# Patient Record
Sex: MALE | Race: WHITE | NOT HISPANIC OR LATINO | Employment: FULL TIME | URBAN - METROPOLITAN AREA
[De-identification: names, ages, dates, MRNs, and addresses within clinical notes are randomized per-mention and may not be internally consistent; named-entity substitution may affect disease eponyms.]

---

## 2024-04-14 ENCOUNTER — HOSPITAL ENCOUNTER (EMERGENCY)
Facility: CLINIC | Age: 34
Discharge: HOME | End: 2024-04-14
Attending: EMERGENCY MEDICINE | Admitting: EMERGENCY MEDICINE
Payer: COMMERCIAL

## 2024-04-14 VITALS
TEMPERATURE: 97.4 F | HEART RATE: 70 BPM | OXYGEN SATURATION: 96 % | BODY MASS INDEX: 27.77 KG/M2 | DIASTOLIC BLOOD PRESSURE: 84 MMHG | SYSTOLIC BLOOD PRESSURE: 118 MMHG | RESPIRATION RATE: 13 BRPM | HEIGHT: 78 IN | WEIGHT: 240 LBS

## 2024-04-14 DIAGNOSIS — I48.91 NEW ONSET ATRIAL FIBRILLATION (H): ICD-10-CM

## 2024-04-14 LAB
ANION GAP SERPL CALCULATED.3IONS-SCNC: 9 MMOL/L (ref 7–15)
ATRIAL RATE - MUSE: 73 BPM
ATRIAL RATE - MUSE: NORMAL BPM
BUN SERPL-MCNC: 16.2 MG/DL (ref 6–20)
CALCIUM SERPL-MCNC: 9.6 MG/DL (ref 8.6–10)
CHLORIDE SERPL-SCNC: 103 MMOL/L (ref 98–107)
CREAT SERPL-MCNC: 1.22 MG/DL (ref 0.67–1.17)
DEPRECATED HCO3 PLAS-SCNC: 28 MMOL/L (ref 22–29)
DIASTOLIC BLOOD PRESSURE - MUSE: NORMAL MMHG
DIASTOLIC BLOOD PRESSURE - MUSE: NORMAL MMHG
EGFRCR SERPLBLD CKD-EPI 2021: 80 ML/MIN/1.73M2
GLUCOSE SERPL-MCNC: 102 MG/DL (ref 70–99)
HOLD SPECIMEN: NORMAL
INTERPRETATION ECG - MUSE: NORMAL
INTERPRETATION ECG - MUSE: NORMAL
P AXIS - MUSE: 59 DEGREES
P AXIS - MUSE: NORMAL DEGREES
POTASSIUM SERPL-SCNC: 4.2 MMOL/L (ref 3.4–5.3)
PR INTERVAL - MUSE: 166 MS
PR INTERVAL - MUSE: NORMAL MS
QRS DURATION - MUSE: 106 MS
QRS DURATION - MUSE: 98 MS
QT - MUSE: 358 MS
QT - MUSE: 410 MS
QTC - MUSE: 451 MS
QTC - MUSE: 484 MS
R AXIS - MUSE: 43 DEGREES
R AXIS - MUSE: 6 DEGREES
SODIUM SERPL-SCNC: 140 MMOL/L (ref 135–145)
SYSTOLIC BLOOD PRESSURE - MUSE: NORMAL MMHG
SYSTOLIC BLOOD PRESSURE - MUSE: NORMAL MMHG
T AXIS - MUSE: 17 DEGREES
T AXIS - MUSE: 17 DEGREES
VENTRICULAR RATE- MUSE: 110 BPM
VENTRICULAR RATE- MUSE: 73 BPM

## 2024-04-14 PROCEDURE — 92960 CARDIOVERSION ELECTRIC EXT: CPT

## 2024-04-14 PROCEDURE — 93005 ELECTROCARDIOGRAM TRACING: CPT | Mod: 59

## 2024-04-14 PROCEDURE — 250N000011 HC RX IP 250 OP 636: Performed by: EMERGENCY MEDICINE

## 2024-04-14 PROCEDURE — 250N000013 HC RX MED GY IP 250 OP 250 PS 637: Performed by: EMERGENCY MEDICINE

## 2024-04-14 PROCEDURE — 36415 COLL VENOUS BLD VENIPUNCTURE: CPT | Performed by: EMERGENCY MEDICINE

## 2024-04-14 PROCEDURE — 80048 BASIC METABOLIC PNL TOTAL CA: CPT | Performed by: EMERGENCY MEDICINE

## 2024-04-14 PROCEDURE — 272N000240 HC CARDIOVERT/DEFIB/PACER SUPP

## 2024-04-14 PROCEDURE — 96374 THER/PROPH/DIAG INJ IV PUSH: CPT

## 2024-04-14 PROCEDURE — 99285 EMERGENCY DEPT VISIT HI MDM: CPT | Mod: 25

## 2024-04-14 RX ORDER — PROPOFOL 10 MG/ML
0.5 INJECTION, EMULSION INTRAVENOUS CONTINUOUS PRN
Status: DISCONTINUED | OUTPATIENT
Start: 2024-04-14 | End: 2024-04-14 | Stop reason: HOSPADM

## 2024-04-14 RX ORDER — PROPOFOL 10 MG/ML
INJECTION, EMULSION INTRAVENOUS DAILY PRN
Status: COMPLETED | OUTPATIENT
Start: 2024-04-14 | End: 2024-04-14

## 2024-04-14 RX ADMIN — PROPOFOL 20 MG: 10 INJECTION, EMULSION INTRAVENOUS at 16:51

## 2024-04-14 RX ADMIN — APIXABAN 5 MG: 5 TABLET, FILM COATED ORAL at 17:36

## 2024-04-14 RX ADMIN — PROPOFOL 80 MG: 10 INJECTION, EMULSION INTRAVENOUS at 16:50

## 2024-04-14 RX ADMIN — PROPOFOL 40 MG: 10 INJECTION, EMULSION INTRAVENOUS at 16:52

## 2024-04-14 ASSESSMENT — ACTIVITIES OF DAILY LIVING (ADL)
ADLS_ACUITY_SCORE: 35

## 2024-04-14 ASSESSMENT — COLUMBIA-SUICIDE SEVERITY RATING SCALE - C-SSRS
2. HAVE YOU ACTUALLY HAD ANY THOUGHTS OF KILLING YOURSELF IN THE PAST MONTH?: NO
6. HAVE YOU EVER DONE ANYTHING, STARTED TO DO ANYTHING, OR PREPARED TO DO ANYTHING TO END YOUR LIFE?: NO
1. IN THE PAST MONTH, HAVE YOU WISHED YOU WERE DEAD OR WISHED YOU COULD GO TO SLEEP AND NOT WAKE UP?: NO

## 2024-04-14 NOTE — ED PROVIDER NOTES
"  History     Chief Complaint:  Palpitations       HPI   Vadim Wood is a 33 year old male without medical problems presenting to us because of atrial fibrillation.  The patient states that he was in his normal state of health last night.  He wears an Apple Watch every day and notes that his resting heart rate is typically in the 50s.  He was surprised when he got an alert that told him that he was in atrial fibrillation.  He went to [employer] today and kept the watch on, with continued notifications and a heart rate that is now in the 90s.  Otherwise, he has not felt lightheaded, experience chest pain, shortness of breath, or any other symptoms.  With these issues, who presents to the ER for evaluation.  He denies excessive alcohol use.  He denies significant stimulant abuse.  He denies prior cardiac issues.  He denies other complaints at this juncture.      Independent Historian:   None - Patient Only    Review of External Notes:   NOne       Medications:    apixaban ANTICOAGULANT (ELIQUIS) 5 MG tablet        Past Medical History:    No past medical history on file.    Past Surgical History:    No past surgical history on file.     Physical Exam   Patient Vitals for the past 24 hrs:   BP Temp Temp src Pulse Resp SpO2 Height Weight   04/14/24 1800 118/84 -- -- 70 13 96 % -- --   04/14/24 1659 -- -- -- 71 (!) 9 95 % -- --   04/14/24 1655 124/70 -- -- -- -- -- -- --   04/14/24 1653 -- -- -- -- 12 -- -- --   04/14/24 1651 -- -- -- -- 16 -- -- --   04/14/24 1644 -- -- -- 82 (!) 7 97 % -- --   04/14/24 1644 134/85 -- -- 90 17 98 % -- --   04/14/24 1600 (!) 119/90 -- -- 94 24 97 % -- --   04/14/24 1537 136/77 97.4  F (36.3  C) Temporal 72 18 97 % 1.981 m (6' 6\") 108.9 kg (240 lb)        Physical Exam    Eye:  Pupils are equal, round, and reactive.  Extraocular movements intact.    ENT:  No rhinorrhea.  Moist mucus membranes.  Normal tongue and tonsil.    Cardiac: Irregularly irregular.  No murmurs, gallops, or " rubs.    Pulmonary:  Clear to auscultation bilaterally.  No wheezes, rales, or rhonchi.    Abdomen:  Positive bowel sounds.  Abdomen is soft and non-distended, without focal tenderness.    Musculoskeletal:  Normal movement of all extremities without evidence for deficit.    Skin:  Warm and dry without rashes.    Neurologic:  Non-focal exam without asymmetric weakness or numbness.     Psychiatric:  Normal affect with appropriate interaction with examiner.      Emergency Department Course   ECG  ECG results from 04/14/24   EKG 12 lead     Value    Systolic Blood Pressure     Diastolic Blood Pressure     Ventricular Rate 110    Atrial Rate     NM Interval     QRS Duration 98        QTc 484    P Axis     R AXIS 43    T Axis 17    Interpretation ECG      Atrial fibrillation with rapid ventricular response  RSR' or QR pattern in V1 suggests right ventricular conduction delay  Abnormal ECG  No previous ECGs available  Confirmed by GENERATED REPORT, COMPUTER ([id number]),  Kristin Curtis ([id number]) on 4/14/2024 3:51:23 PM       EKG #2: Patient underwent a second EKG after cardioversion which shows a normal sinus rhythm with a rate in the 70s.  Shows normal axis and interval.  There is no ST ovation, depression, or T wave inversion concerning for ischemia.  Normal EKG.      Laboratory:  Labs Ordered and Resulted from Time of ED Arrival to Time of ED Departure   BASIC METABOLIC PANEL - Abnormal       Result Value    Sodium 140      Potassium 4.2      Chloride 103      Carbon Dioxide (CO2) 28      Anion Gap 9      Urea Nitrogen 16.2      Creatinine 1.22 (*)     GFR Estimate 80      Calcium 9.6      Glucose 102 (*)         Procedures   Electrical Cardioversion         Procedure: Electrical Cardioversion        Indication: Atrial Fibrillation     Consent: Written from Patient     Universal Protocol: Universal protocol was followed and time out conducted just prior to starting procedure, confirming patient identity, site/side,  procedure, patient position, and availability of correct equipment and implants.      Anesthesia/Sedation: Sedation: The patient was sedated, see separate procedure note for details.      Procedure Detail:   Electrodes were placed: Anterior/posterior  Trial #1: Synchronized Cardioversion at 200 Joules     Cardioversion was successful      Patient Status:  The patient tolerated the procedure well: Yes. There were no complications      Sedation     Procedure: Procedural Sedation    Sedation Level: Moderate    Indication: Cardioversion    Consent: Verbal from Patient     Universal protocol: Universal protocol was followed and time out conducted just prior to starting procedure, confirming patient identity, site/side, procedure, patient position, and availability of correct equipment and implants.     Last PO Intake: Emergent procedure    ASA Class: Class 1 - A normal healthy patient     Exam:  Mallampati:  Grade 1 - Soft palate, uvula, and pillars visible    Lungs: Clear   Heart: Regular rate and rhythm     Medication: Propofol  Dose: 140 mg     Monitoring:  Monitoring consisted of heart rate, cardiac, continuous pulse oximetry, frequent blood pressure checks.   There was constant attendance by RN until patient recovered and constant attendance by physician until patient stable.   Intubation and emergency airway equipment available.     Response: Vital signs stable, oxygen saturations greater than 92%       Patient Status: Post procedure patient was alert.     Total Physician Drug Administration / Monitoring Time: 15 minutes.     Patient was monitored during recovery and returned to pre-procedure baseline.   .      Emergency Department Course & Assessments:    Interventions:  Medications   propofol (DIPRIVAN) injection 10 mg/mL vial (has no administration in time range)   propofol (DIPRIVAN) injection 10 mg/mL vial (40 mg Intravenous $Given 4/14/24 9052)   apixaban ANTICOAGULANT (ELIQUIS) tablet 5 mg (5 mg Oral $Given  4/14/24 4496)        Independent Interpretation (X-rays, CTs, rhythm strip):  None    Consultations/Discussion of Management or Tests:  None        Social Determinants of Health affecting care:   None    Disposition:  The patient was discharged.     Impression & Plan    CMS Diagnoses: None    Medical Decision Making:  This healthy 33-year-old presents to us in atrial fibrillation.  There is no clear inciting event for this.  He is otherwise a very healthy gentleman, exercising regularly, with no medical problems.  The patient is very clear on the time of his dysrhythmia, starting at 1052 last night.  I believe this time as he otherwise wears his Apple Watch consistently every single day and this is the first time that he has had an elevated heart rate or irregular heartbeat.  Therefore, I feel he is an ideal candidate for electrocardioversion.  He shows no renal insufficiency or electrolyte abnormalities.  After discussion with him on the benefits and risks of cardioversion, he is elected to move forward.  This was performed as above without difficulty.  The patient was then observed for an hour and has stayed in normal sinus rhythm.  He will be discharged home on 30 days of Eliquis and I have scheduled him for an echocardiogram and follow-up with cardiology.  The patient's questions were answered he is comfortable to plan for discharge.      Diagnosis:    ICD-10-CM    1. New onset atrial fibrillation (H)  I48.91 Echocardiogram Complete     Adult Cardiology [facility] Referral           Discharge Medications:  New Prescriptions    APIXABAN ANTICOAGULANT (ELIQUIS) 5 MG TABLET    Take 1 tablet (5 mg) by mouth 2 times daily for 30 days          Chad A. Trierweiler, MD  4/14/2024   Trierweiler, Chad A, MD Trierweiler, Chad A, MD  04/14/24 5582

## 2024-04-14 NOTE — ED TRIAGE NOTES
The patient reports that he noticed this morning that his watch noted him to be in Afib and it continues to show it today. He does not have history of Afib. Denies blood thinners. He reports it feel like his heart skips a beat every once in a while and is faster than normal for him but no other symptoms.      Triage Assessment (Adult)       Row Name 04/14/24 1533          Triage Assessment    Airway WDL WDL        Respiratory WDL    Respiratory WDL WDL        Cardiac WDL    Cardiac WDL X;all  feel like heart skips a beat and is faster rate than normal.        Cognitive/Neuro/Behavioral WDL    Cognitive/Neuro/Behavioral WDL WDL

## 2024-05-01 ENCOUNTER — HOSPITAL ENCOUNTER (OUTPATIENT)
Dept: CARDIOLOGY | Facility: CLINIC | Age: 34
Discharge: HOME | End: 2024-05-01
Attending: EMERGENCY MEDICINE | Admitting: EMERGENCY MEDICINE
Payer: COMMERCIAL

## 2024-05-01 DIAGNOSIS — I48.91 NEW ONSET ATRIAL FIBRILLATION (H): ICD-10-CM

## 2024-05-01 LAB — LVEF ECHO: NORMAL

## 2024-05-01 PROCEDURE — 93306 TTE W/DOPPLER COMPLETE: CPT

## 2024-05-01 PROCEDURE — 93306 TTE W/DOPPLER COMPLETE: CPT | Mod: 26 | Performed by: INTERNAL MEDICINE

## 2024-05-03 ENCOUNTER — OFFICE VISIT (OUTPATIENT)
Dept: CARDIOLOGY | Facility: CLINIC | Age: 34
End: 2024-05-03
Attending: EMERGENCY MEDICINE
Payer: COMMERCIAL

## 2024-05-03 ENCOUNTER — LAB (OUTPATIENT)
Dept: LAB | Facility: CLINIC | Age: 34
End: 2024-05-03
Payer: COMMERCIAL

## 2024-05-03 VITALS
SYSTOLIC BLOOD PRESSURE: 126 MMHG | BODY MASS INDEX: 28.6 KG/M2 | HEIGHT: 78 IN | HEART RATE: 66 BPM | DIASTOLIC BLOOD PRESSURE: 82 MMHG | WEIGHT: 247.2 LBS

## 2024-05-03 DIAGNOSIS — I77.810 AORTIC ROOT DILATATION (H): ICD-10-CM

## 2024-05-03 DIAGNOSIS — I48.0 AF (PAROXYSMAL ATRIAL FIBRILLATION) (H): Primary | ICD-10-CM

## 2024-05-03 DIAGNOSIS — I48.0 AF (PAROXYSMAL ATRIAL FIBRILLATION) (H): ICD-10-CM

## 2024-05-03 LAB
ANION GAP SERPL CALCULATED.3IONS-SCNC: 12 MMOL/L (ref 7–15)
BUN SERPL-MCNC: 13 MG/DL (ref 6–20)
CALCIUM SERPL-MCNC: 10.1 MG/DL (ref 8.6–10)
CHLORIDE SERPL-SCNC: 101 MMOL/L (ref 98–107)
CREAT SERPL-MCNC: 1.19 MG/DL (ref 0.67–1.17)
DEPRECATED HCO3 PLAS-SCNC: 27 MMOL/L (ref 22–29)
EGFRCR SERPLBLD CKD-EPI 2021: 83 ML/MIN/1.73M2
GLUCOSE SERPL-MCNC: 89 MG/DL (ref 70–99)
MAGNESIUM SERPL-MCNC: 2.3 MG/DL (ref 1.7–2.3)
POTASSIUM SERPL-SCNC: 4 MMOL/L (ref 3.4–5.3)
SODIUM SERPL-SCNC: 140 MMOL/L (ref 135–145)
TSH SERPL DL<=0.005 MIU/L-ACNC: 1.81 UIU/ML (ref 0.3–4.2)

## 2024-05-03 PROCEDURE — 99204 OFFICE O/P NEW MOD 45 MIN: CPT | Performed by: INTERNAL MEDICINE

## 2024-05-03 PROCEDURE — 82374 ASSAY BLOOD CARBON DIOXIDE: CPT

## 2024-05-03 PROCEDURE — 93000 ELECTROCARDIOGRAM COMPLETE: CPT | Performed by: INTERNAL MEDICINE

## 2024-05-03 PROCEDURE — 82565 ASSAY OF CREATININE: CPT

## 2024-05-03 PROCEDURE — 84443 ASSAY THYROID STIM HORMONE: CPT

## 2024-05-03 PROCEDURE — 83735 ASSAY OF MAGNESIUM: CPT

## 2024-05-03 PROCEDURE — 36415 COLL VENOUS BLD VENIPUNCTURE: CPT

## 2024-05-03 NOTE — PROGRESS NOTES
HPI and Plan:   Vadim is a pleasant 33-year-old male who was recently seen in the emergency room on 14 April for atrial fibrillation.    On Saturday night and he had watched golf and had 2 beers.  He was resting and then his Apple Watch notified him that his heart rate was irregular and may be atrial fibrillation.  Heart rates varying about 90 to 100 bpm.  He was asymptomatic.  He was not aware of any palpitations dizziness or syncope or tiredness.  As rhythm persisted, eventually went to the emergency room and was noted to be in atrial fibrillation with mildly rapid ventricular rate of 110 bpm.  I reviewed the EKGs from that day.  He was seen by the emergency room physician and cardioverted successfully.  He was put on Eliquis which she is still taking.    He has not had any recurrent episodes.  He denies any chest pain shortness of breath palpitations.  He has no history of hypertension or diabetes.  No family history of premature CAD, sudden death or atrial fibrillation or aortic disease.  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     He exercises regularly.  He does put cardio as well as lifting weights.    On exam, regular rate and rhythm.  No murmurs.  Chest was reauscultation.    EKG done today was reviewed by me and revealed sinus rhythm without ischemic changes.  Incomplete right bundle branch block was noted.    Echocardiogram done recently revealed normal ejection fraction.  LA size was normal.  There was slight dilatation of aortic root at 4.1 cm but patient's height is 6 foot 6 inches.  This may be upper limits of normal for him.    Impression    1.  Paroxysmal atrial fibrillation, new onset, was normal symptomatic.  He was notified by [facility] and underwent cardioversion in the emergency room.  He is on Eliquis but his chads vascular score is 0.  He has likely lone atrial fibrillation.  I  suggested completing 3 to 4 weeks of Eliquis and stopping it and then can consider only baby aspirin since this is the first episode he was normal symptomatic, I am not adding any AV charmaine blockers at this time.  We discussed pathophysiology of atrial fibrillation including treatment options of beta-blockers, antiarrhythmic drugs as well as ablation as needed.  For now, we will continue observation.    2.  Aortic root dilatation of 4.1 cm, given his height, this may be just upper limits of normal.  Nevertheless, I would suggest an MRI to get more accurate size of the aortic root and ascending aorta.    Further recommendations based on the results of the MRI.  If he has no recurrent episodes, we will see him on an annual basis.  On the other hand if he has more frequent episodes of atrial fibrillation, he will be referred to electrophysiologist.    In the emergency room he only had a BMP which showed slight increase in creatinine.  I will repeat a BMP today with TSH and magnesium levels to ensure that there are no abnormalities in the labs.    Thank you for allowing us to personally care of this nice patient.  At today's visit, reviewed the emergency room notes, the EKGs from that day.  Echo report was reviewed.  BMP was reviewed.  Today's medical decision making was of moderate complexity.    Sincerely,    Dwain Caballero MD          Orders Placed This Encounter   Procedures    MRI Angiogram chest w & w/o contrast    Basic metabolic panel    TSH    Magnesium    Follow-Up with Cardiology    EKG 12-lead complete w/read - Clinics (performed today)       No orders of the defined types were placed in this encounter.      There are no discontinued medications.      Encounter Diagnoses   Name Primary?    AF (paroxysmal atrial fibrillation) (H) Yes    Aortic root dilatation (H24)        CURRENT MEDICATIONS:  Current Outpatient Medications   Medication Sig Dispense Refill    apixaban ANTICOAGULANT (ELIQUIS) 5 MG tablet Take 1  "tablet (5 mg) by mouth 2 times daily for 30 days 60 tablet 0       ALLERGIES   No Known Allergies    PAST MEDICAL HISTORY:  History reviewed. No pertinent past medical history.    PAST SURGICAL HISTORY:  History reviewed. No pertinent surgical history.    FAMILY HISTORY:  Family History   Problem Relation Age of Onset    No Known Problems Mother     No Known Problems Father        SOCIAL HISTORY:  Social History     Socioeconomic History    Marital status: Single     Spouse name: None    Number of children: None    Years of education: None    Highest education level: None   Tobacco Use    Smoking status: Never    Smokeless tobacco: Never   Substance and Sexual Activity    Alcohol use: Yes     Comment: occ       Review of Systems:  Skin:          Eyes:         ENT:         Respiratory:  Negative       Cardiovascular:  Negative      Gastroenterology:        Genitourinary:         Musculoskeletal:         Neurologic:         Psychiatric:         Heme/Lymph/Imm:         Endocrine:  Negative        Physical Exam:  Vitals: /82 (BP Location: Left arm, Patient Position: Sitting)   Pulse 66   Ht 1.981 m (6' 6\")   Wt 112.1 kg (247 lb 3.2 oz)   BMI 28.57 kg/m        CC  Chad A Trierweiler, MD  [facility]  [address]  [place] [address]                "

## 2024-05-03 NOTE — LETTER
5/3/2024    Physician No Ref-Primary  No address on file    RE: Vadim Wood       Dear Colleague,     I had the pleasure of seeing Vadim Wood in the [facility].  HPI and Plan:   Vadim is a pleasant 33-year-old male who was recently seen in the emergency room on 14 April for atrial fibrillation.    On Saturday night and he had watched golf and had 2 beers.  He was resting and then his Apple Watch notified him that his heart rate was irregular and may be atrial fibrillation.  Heart rates varying about 90 to 100 bpm.  He was asymptomatic.  He was not aware of any palpitations dizziness or syncope or tiredness.  As rhythm persisted, eventually went to the emergency room and was noted to be in atrial fibrillation with mildly rapid ventricular rate of 110 bpm.  I reviewed the EKGs from that day.  He was seen by the emergency room physician and cardioverted successfully.  He was put on Eliquis which she is still taking.    He has not had any recurrent episodes.  He denies any chest pain shortness of breath palpitations.  He has no history of hypertension or diabetes.  No family history of premature CAD, sudden death or atrial fibrillation or aortic disease.  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     He exercises regularly.  He does put cardio as well as lifting weights.    On exam, regular rate and rhythm.  No murmurs.  Chest was reauscultation.    EKG done today was reviewed by me and revealed sinus rhythm without ischemic changes.  Incomplete right bundle branch block was noted.    Echocardiogram done recently revealed normal ejection fraction.  LA size was normal.  There was slight dilatation of aortic root at 4.1 cm but patient's height is 6 foot 6 inches.  This may be upper limits of normal for him.    Impression    1.  Paroxysmal atrial fibrillation, new onset, was normal symptomatic.   He was notified by [facility] and underwent cardioversion in the emergency room.  He is on Eliquis but his chads vascular score is 0.  He has likely lone atrial fibrillation.  I suggested completing 3 to 4 weeks of Eliquis and stopping it and then can consider only baby aspirin since this is the first episode he was normal symptomatic, I am not adding any AV charmaine blockers at this time.  We discussed pathophysiology of atrial fibrillation including treatment options of beta-blockers, antiarrhythmic drugs as well as ablation as needed.  For now, we will continue observation.    2.  Aortic root dilatation of 4.1 cm, given his height, this may be just upper limits of normal.  Nevertheless, I would suggest an MRI to get more accurate size of the aortic root and ascending aorta.    Further recommendations based on the results of the MRI.  If he has no recurrent episodes, we will see him on an annual basis.  On the other hand if he has more frequent episodes of atrial fibrillation, he will be referred to electrophysiologist.    In the emergency room he only had a BMP which showed slight increase in creatinine.  I will repeat a BMP today with TSH and magnesium levels to ensure that there are no abnormalities in the labs.    Thank you for allowing us to personally care of this nice patient.  At today's visit, reviewed the emergency room notes, the EKGs from that day.  Echo report was reviewed.  BMP was reviewed.  Today's medical decision making was of moderate complexity.    Sincerely,    Dwain Caballero MD          Orders Placed This Encounter   Procedures    MRI Angiogram chest w & w/o contrast    Basic metabolic panel    TSH    Magnesium    Follow-Up with Cardiology    EKG 12-lead complete w/read - Clinics (performed today)       No orders of the defined types were placed in this encounter.      There are no discontinued medications.      Encounter Diagnoses   Name Primary?    AF (paroxysmal atrial fibrillation) (H) Yes     "Aortic root dilatation (H24)        CURRENT MEDICATIONS:  Current Outpatient Medications   Medication Sig Dispense Refill    apixaban ANTICOAGULANT (ELIQUIS) 5 MG tablet Take 1 tablet (5 mg) by mouth 2 times daily for 30 days 60 tablet 0       ALLERGIES   No Known Allergies    PAST MEDICAL HISTORY:  History reviewed. No pertinent past medical history.    PAST SURGICAL HISTORY:  History reviewed. No pertinent surgical history.    FAMILY HISTORY:  Family History   Problem Relation Age of Onset    No Known Problems Mother     No Known Problems Father        SOCIAL HISTORY:  Social History     Socioeconomic History    Marital status: Single     Spouse name: None    Number of children: None    Years of education: None    Highest education level: None   Tobacco Use    Smoking status: Never    Smokeless tobacco: Never   Substance and Sexual Activity    Alcohol use: Yes     Comment: occ       Review of Systems:  Skin:          Eyes:         ENT:         Respiratory:  Negative       Cardiovascular:  Negative      Gastroenterology:        Genitourinary:         Musculoskeletal:         Neurologic:         Psychiatric:         Heme/Lymph/Imm:         Endocrine:  Negative        Physical Exam:  Vitals: /82 (BP Location: Left arm, Patient Position: Sitting)   Pulse 66   Ht 1.981 m (6' 6\")   Wt 112.1 kg (247 lb 3.2 oz)   BMI 28.57 kg/m        CC  Chad A Trierweiler, MD  [facility]  [address]  [place] [address]      Thank you for allowing me to participate in the care of your patient.      Sincerely,     Dwain Caballero MD     [facility]  "

## 2024-05-06 ENCOUNTER — TELEPHONE (OUTPATIENT)
Dept: CARDIOLOGY | Facility: CLINIC | Age: 34
End: 2024-05-06
Payer: COMMERCIAL

## 2024-05-06 NOTE — TELEPHONE ENCOUNTER
Call out to Pt to discuss recent BMP and DR Caballero's recommendations.     Dwain Caballero MD Schwartz, Malorie PATEL RN  Crea still elevated. Calcium mildly increased. Suggest referral to [facility] -= Dr Clements/Bridgett Espinal RN

## 2024-05-06 NOTE — TELEPHONE ENCOUNTER
Called Pt reviewed labs. Pt denies any over the counter, or use of vitamins, or extra calcium supplement. Will have referral sent for DR rooln or Bridgett. LENY Espinal RN

## 2024-05-19 ENCOUNTER — HEALTH MAINTENANCE LETTER (OUTPATIENT)
Age: 34
End: 2024-05-19

## 2024-06-03 ENCOUNTER — TELEPHONE (OUTPATIENT)
Dept: CARDIOLOGY | Facility: CLINIC | Age: 34
End: 2024-06-03
Payer: COMMERCIAL

## 2024-06-03 NOTE — TELEPHONE ENCOUNTER
[facility] Call Center    Phone Message    May a detailed message be left on voicemail: yes     Reason for Call: Other: Patient would recommendation for a cardiologist in the [facility]. Patient just moved that area. Please reach out. Thank you       Action Taken: Other: cardiology     Travel Screening: Not Applicable     Date of Service:

## 2025-06-08 ENCOUNTER — HEALTH MAINTENANCE LETTER (OUTPATIENT)
Age: 35
End: 2025-06-08